# Patient Record
Sex: MALE | Race: WHITE | Employment: FULL TIME | URBAN - METROPOLITAN AREA
[De-identification: names, ages, dates, MRNs, and addresses within clinical notes are randomized per-mention and may not be internally consistent; named-entity substitution may affect disease eponyms.]

---

## 2020-08-24 ENCOUNTER — HOSPITAL ENCOUNTER (EMERGENCY)
Age: 25
Discharge: HOME | End: 2020-08-24
Attending: EMERGENCY MEDICINE

## 2020-08-24 VITALS
DIASTOLIC BLOOD PRESSURE: 69 MMHG | OXYGEN SATURATION: 98 % | SYSTOLIC BLOOD PRESSURE: 134 MMHG | RESPIRATION RATE: 16 BRPM | HEART RATE: 88 BPM | TEMPERATURE: 98.7 F

## 2020-08-24 DIAGNOSIS — Z20.822 COUGH WITH EXPOSURE TO COVID-19 VIRUS: Primary | ICD-10-CM

## 2020-08-24 DIAGNOSIS — R05.8 COUGH WITH EXPOSURE TO COVID-19 VIRUS: Primary | ICD-10-CM

## 2020-08-24 PROCEDURE — 87635 SARS-COV-2 COVID-19 AMP PRB: CPT

## 2020-08-24 PROCEDURE — 99282 EMERGENCY DEPT VISIT SF MDM: CPT

## 2020-08-24 NOTE — LETTER
[facility] EMERGENCY DEPT 
[address] 
[address] [facility] [address] [phone] Work/School Note Date: 8/24/2020 To Whom It May concern: 
 
Juice Nichols was seen and treated today in the emergency room by the following provider(s): 
Attending Provider: Safia Polanco MD. Juice Nichols may return to work when has negative covid-19 test. 
 
Sincerely, 
 
 
 
 
Gaby Long MD

## 2020-08-24 NOTE — ED NOTES
1:11 AM  08/24/20     Discharge instructions given to patient (name) with verbalization of understanding. Patient accompanied by self. Patient discharged with the following prescriptions none. Patient discharged to home (destination).       Katya Millard RN

## 2020-08-24 NOTE — ED TRIAGE NOTES
Patient comes in stating that he needs a work note because he has been quarantining since he has been around somebody with covid and would like a covid test. Patient has wet clothes on because he was at the beach prior to coming to ER.

## 2020-08-24 NOTE — ED PROVIDER NOTES
25 yo AAM with no relevant PMHx presents with concern for covid-19 exposure. Pt states that his sister tested positive and that his other sister started to have similar symptoms and he's been around the other sister. Pt states he's felt light-headed, has had cough, sore throat. Pt states he is scared to go to work and expose other people but that he's afraid of being fired without a work note. No fevers, no vomiting, no cp/sob. History reviewed. No pertinent past medical history. History reviewed. No pertinent surgical history. History reviewed. No pertinent family history. Social History     Socioeconomic History    Marital status: SINGLE     Spouse name: Not on file    Number of children: Not on file    Years of education: Not on file    Highest education level: Not on file   Occupational History    Not on file   Social Needs    Financial resource strain: Not on file    Food insecurity     Worry: Not on file     Inability: Not on file    Transportation needs     Medical: Not on file     Non-medical: Not on file   Tobacco Use    Smoking status: Current Some Day Smoker    Smokeless tobacco: Never Used   Substance and Sexual Activity    Alcohol use:  Yes    Drug use: Yes     Types: Marijuana     Comment: last used 10/2/13    Sexual activity: Not on file   Lifestyle    Physical activity     Days per week: Not on file     Minutes per session: Not on file    Stress: Not on file   Relationships    Social connections     Talks on phone: Not on file     Gets together: Not on file     Attends Christian service: Not on file     Active member of club or organization: Not on file     Attends meetings of clubs or organizations: Not on file     Relationship status: Not on file    Intimate partner violence     Fear of current or ex partner: Not on file     Emotionally abused: Not on file     Physically abused: Not on file     Forced sexual activity: Not on file   Other Topics Concern     Service Not Asked    Blood Transfusions Not Asked    Caffeine Concern Not Asked    Occupational Exposure Not Asked    Hobby Hazards Not Asked    Sleep Concern Not Asked    Stress Concern Not Asked    Weight Concern Not Asked    Special Diet Not Asked    Back Care Not Asked    Exercise Not Asked    Bike Helmet Not Asked   [address] Not Asked    Self-Exams Not Asked   Social History Narrative    Not on file         ALLERGIES: Kiwi    Review of Systems   Constitutional: Negative for fever. HENT: Positive for sore throat. Negative for trouble swallowing. Respiratory: Positive for cough. Negative for shortness of breath. Cardiovascular: Negative for chest pain. Gastrointestinal: Negative for abdominal pain and vomiting. Genitourinary: Negative for difficulty urinating. Skin: Negative for wound. Neurological: Negative for syncope. Psychiatric/Behavioral: Negative for behavioral problems. All other systems reviewed and are negative. There were no vitals filed for this visit. Physical Exam  Vitals signs and nursing note reviewed. Constitutional:       General: He is not in acute distress. Appearance: He is well-developed. Comments: well-appearing, nad   HENT:      Head: Normocephalic and atraumatic. Neck:      Musculoskeletal: Normal range of motion. Cardiovascular:      Rate and Rhythm: Normal rate. Pulses: Normal pulses. Pulmonary:      Effort: Pulmonary effort is normal. No respiratory distress. Breath sounds: Normal breath sounds. Abdominal:      Palpations: Abdomen is soft. Tenderness: There is no abdominal tenderness. Musculoskeletal: Normal range of motion. Comments: Mechanically stable   Skin:     General: Skin is warm. Neurological:      General: No focal deficit present. Mental Status: He is alert and oriented to person, place, and time.       Comments: No focal deficits noted   Psychiatric:         Behavior: Behavior normal.          MDM  Number of Diagnoses or Management Options  Cough with exposure to COVID-19 virus:   Diagnosis management comments: 27 yo AAM with no relevant PMHx presents with concern for covid-19 exposure. Pt with cough, sore throat but no fevers, no sob. Examination unremarkable with ctab and no increased wob. Will swab for covid-19 but pt ok for home care, self-quarantine. Discussed poc for dc home, symptom management, follow-up, return precautions. Amount and/or Complexity of Data Reviewed  Review and summarize past medical records: yes    Patient Progress  Patient progress: stable         Procedures      PROGRESS NOTES    12:45 AM:   Vargas Fofana MD arrives to the bedside to evaluate the patient. Answered the patient's questions regarding the treatment plan. CONSULTATIONS  None      MEDICATIONS ORDERED  Medications - No data to display    RADIOLOGY INTERPRETATIONS  No orders to display       EKG READINGS/LABORATORY RESULTS  No results found for this or any previous visit (from the past 12 hour(s)). ED DIAGNOSIS & DISPOSITION INFORMATION  Diagnosis:   1. Cough with exposure to COVID-19 virus        Disposition: Discharged    Follow-up Information     Follow up With Specialties Details Why Contact Info    Demario Mcconnell MD Pediatric Medicine Schedule an appointment as soon as possible for a visit  [facility] EMERGENCY DEPT Emergency Medicine  As needed [address]  [phone]          Patient's Medications   Start Taking    No medications on file   Continue Taking    HYDROCODONE-ACETAMINOPHEN ([address] [facility] [address] [address]) 7.5-325 MG PER TABLET    Take 1 Tab by mouth every six (6) hours as needed for Pain. Max Daily Amount: 4 Tabs.    These Medications have changed    No medications on file   Stop Taking    No medications on file           Vargas Fofana MD.

## 2020-08-24 NOTE — DISCHARGE INSTRUCTIONS
Patient Education        Coronavirus (RELTC-99): Care Instructions  Overview  The coronavirus disease (COVID-19) is caused by a virus. Symptoms may include a fever, a cough, and shortness of breath. It mainly spreads person-to-person through droplets from coughing and sneezing. The virus also can spread when people are in close contact with someone who is infected. Most people have mild symptoms and can take care of themselves at home. If their symptoms get worse, they may need care in a hospital. There is no medicine to fight the virus. It's important to not spread the virus to others. If you have COVID-19, wear a face cover anytime you are around other people. You need to isolate yourself while you are sick. Your doctor or local public health official will tell you when you no longer need to be isolated. Leave your home only if you need to get medical care. Follow-up care is a key part of your treatment and safety. Be sure to make and go to all appointments, and call your doctor if you are having problems. It's also a good idea to know your test results and keep a list of the medicines you take. How can you care for yourself at home? · Get extra rest. It can help you feel better. · Drink plenty of fluids. This helps replace fluids lost from fever. Fluids also help ease a scratchy throat. Water, soup, fruit juice, and hot tea with lemon are good choices. · Take acetaminophen (such as Tylenol) to reduce a fever. It may also help with muscle aches. Read and follow all instructions on the label. · Sponge your body with lukewarm water to help with fever. Don't use cold water or ice. · Use petroleum jelly on sore skin. This can help if the skin around your nose and lips becomes sore from rubbing a lot with tissues. Tips for isolation  · Wear a cloth face cover when you are around other people. It can help stop the spread of the virus when you cough or sneeze. · Limit contact with people in your home.  If possible, stay in a separate bedroom and use a separate bathroom. · If you have to leave home, avoid crowds and try to stay at least 6 feet away from other people. · Avoid contact with pets and other animals. · Cover your mouth and nose with a tissue when you cough or sneeze. Then throw it in the trash right away. · Wash your hands often, especially after you cough or sneeze. Use soap and water, and scrub for at least 20 seconds. If soap and water aren't available, use an alcohol-based hand . · Don't share personal household items. These include bedding, towels, cups and glasses, and eating utensils. · [address] in the warmest water allowed for the fabric type, and dry it completely. It's okay to wash other people's laundry with yours. · Clean and disinfect your home every day. Use household  and disinfectant wipes or sprays. Take special care to clean things that you grab with your hands. These include doorknobs, remote controls, phones, and handles on your refrigerator and microwave. And don't forget countertops, tabletops, bathrooms, and computer keyboards. When should you call for help? [id number] anytime you think you may need emergency care. For example, call if you have life-threatening symptoms, such as:  · You have severe trouble breathing. (You can't talk at all.)  · You have constant chest pain or pressure. · You are severely dizzy or lightheaded. · You are confused or can't think clearly. · Your face and lips have a blue color. · You pass out (lose consciousness) or are very hard to wake up. Call your doctor now or seek immediate medical care if:  · You have moderate trouble breathing. (You can't speak a full sentence.)  · You are coughing up blood (more than about 1 teaspoon). · You have signs of low blood pressure. These include feeling lightheaded; being too weak to stand; and having cold, pale, clammy skin.   Watch closely for changes in your health, and be sure to contact your doctor if:  · Your symptoms get worse. · You are not getting better as expected. Call before you go to the doctor's office. Follow their instructions. And wear a cloth face cover. Current as of: May 8, 2020               Content Version: 12.5  © 2006-2020 [employer]. Care instructions adapted under license by [employer] (which disclaims liability or warranty for this information). If you have questions about a medical condition or this instruction, always ask your healthcare professional. [place] [address] any warranty or liability for your use of this information.

## 2020-08-25 ENCOUNTER — PATIENT OUTREACH (OUTPATIENT)
Dept: CASE MANAGEMENT | Age: 25
End: 2020-08-25

## 2020-08-25 LAB
HEALTH STATUS, XMCV2T: NORMAL
SARS-COV-2, COV2NT: NOT DETECTED
SOURCE, COVRS: NORMAL
SPECIMEN TYPE, XMCV1T: NORMAL

## 2020-08-25 NOTE — PROGRESS NOTES
Date/Time:  8/25/2020 10:12 AM  Attempted to reach patient by telephone. Unable to leave HIPPA compliant message requesting a return call. Will attempt to reach patient again.

## 2020-08-26 ENCOUNTER — PATIENT OUTREACH (OUTPATIENT)
Dept: CASE MANAGEMENT | Age: 25
End: 2020-08-26

## 2020-08-26 NOTE — PROGRESS NOTES
Date/Time:  8/26/2020 2:28 PM  2nd attempt to reach patient by telephone. All numbers listed are invalid. This episode is resolved. Covid test negative.